# Patient Record
Sex: FEMALE | Race: AMERICAN INDIAN OR ALASKA NATIVE | NOT HISPANIC OR LATINO | Employment: OTHER | URBAN - METROPOLITAN AREA
[De-identification: names, ages, dates, MRNs, and addresses within clinical notes are randomized per-mention and may not be internally consistent; named-entity substitution may affect disease eponyms.]

---

## 2020-06-03 ENCOUNTER — HOSPITAL ENCOUNTER (EMERGENCY)
Facility: MEDICAL CENTER | Age: 85
End: 2020-06-03
Attending: EMERGENCY MEDICINE
Payer: MEDICARE

## 2020-06-03 ENCOUNTER — APPOINTMENT (OUTPATIENT)
Dept: RADIOLOGY | Facility: MEDICAL CENTER | Age: 85
End: 2020-06-03
Attending: EMERGENCY MEDICINE
Payer: MEDICARE

## 2020-06-03 VITALS
RESPIRATION RATE: 18 BRPM | HEART RATE: 74 BPM | SYSTOLIC BLOOD PRESSURE: 129 MMHG | TEMPERATURE: 98.6 F | WEIGHT: 146 LBS | DIASTOLIC BLOOD PRESSURE: 59 MMHG | OXYGEN SATURATION: 96 %

## 2020-06-03 DIAGNOSIS — R51.9 ACUTE NONINTRACTABLE HEADACHE, UNSPECIFIED HEADACHE TYPE: ICD-10-CM

## 2020-06-03 DIAGNOSIS — N30.00 ACUTE CYSTITIS WITHOUT HEMATURIA: ICD-10-CM

## 2020-06-03 LAB
ALBUMIN SERPL BCP-MCNC: 3.7 G/DL (ref 3.2–4.9)
ALBUMIN/GLOB SERPL: 1.1 G/DL
ALP SERPL-CCNC: 210 U/L (ref 30–99)
ALT SERPL-CCNC: 40 U/L (ref 2–50)
ANION GAP SERPL CALC-SCNC: 13 MMOL/L (ref 7–16)
APPEARANCE UR: ABNORMAL
AST SERPL-CCNC: 47 U/L (ref 12–45)
BACTERIA #/AREA URNS HPF: ABNORMAL /HPF
BASOPHILS # BLD AUTO: 0.2 % (ref 0–1.8)
BASOPHILS # BLD: 0.01 K/UL (ref 0–0.12)
BILIRUB SERPL-MCNC: 0.5 MG/DL (ref 0.1–1.5)
BILIRUB UR QL STRIP.AUTO: ABNORMAL
BUN SERPL-MCNC: 19 MG/DL (ref 8–22)
CALCIUM SERPL-MCNC: 8.9 MG/DL (ref 8.5–10.5)
CHLORIDE SERPL-SCNC: 95 MMOL/L (ref 96–112)
CO2 SERPL-SCNC: 23 MMOL/L (ref 20–33)
COLOR UR: ABNORMAL
CREAT SERPL-MCNC: 0.75 MG/DL (ref 0.5–1.4)
EKG IMPRESSION: NORMAL
EOSINOPHIL # BLD AUTO: 0.01 K/UL (ref 0–0.51)
EOSINOPHIL NFR BLD: 0.2 % (ref 0–6.9)
EPI CELLS #/AREA URNS HPF: ABNORMAL /HPF
ERYTHROCYTE [DISTWIDTH] IN BLOOD BY AUTOMATED COUNT: 40.1 FL (ref 35.9–50)
GLOBULIN SER CALC-MCNC: 3.5 G/DL (ref 1.9–3.5)
GLUCOSE SERPL-MCNC: 133 MG/DL (ref 65–99)
GLUCOSE UR STRIP.AUTO-MCNC: NEGATIVE MG/DL
HCT VFR BLD AUTO: 40.1 % (ref 37–47)
HGB BLD-MCNC: 13.2 G/DL (ref 12–16)
HYALINE CASTS #/AREA URNS LPF: ABNORMAL /LPF
IMM GRANULOCYTES # BLD AUTO: 0.01 K/UL (ref 0–0.11)
IMM GRANULOCYTES NFR BLD AUTO: 0.2 % (ref 0–0.9)
KETONES UR STRIP.AUTO-MCNC: 15 MG/DL
LEUKOCYTE ESTERASE UR QL STRIP.AUTO: ABNORMAL
LYMPHOCYTES # BLD AUTO: 0.95 K/UL (ref 1–4.8)
LYMPHOCYTES NFR BLD: 17.4 % (ref 22–41)
MCH RBC QN AUTO: 28 PG (ref 27–33)
MCHC RBC AUTO-ENTMCNC: 32.9 G/DL (ref 33.6–35)
MCV RBC AUTO: 85 FL (ref 81.4–97.8)
MICRO URNS: ABNORMAL
MONOCYTES # BLD AUTO: 0.39 K/UL (ref 0–0.85)
MONOCYTES NFR BLD AUTO: 7.1 % (ref 0–13.4)
NEUTROPHILS # BLD AUTO: 4.09 K/UL (ref 2–7.15)
NEUTROPHILS NFR BLD: 74.9 % (ref 44–72)
NITRITE UR QL STRIP.AUTO: NEGATIVE
NRBC # BLD AUTO: 0 K/UL
NRBC BLD-RTO: 0 /100 WBC
PH UR STRIP.AUTO: 7 [PH] (ref 5–8)
PLATELET # BLD AUTO: 261 K/UL (ref 164–446)
PMV BLD AUTO: 9.9 FL (ref 9–12.9)
POTASSIUM SERPL-SCNC: 4.3 MMOL/L (ref 3.6–5.5)
PROT SERPL-MCNC: 7.2 G/DL (ref 6–8.2)
PROT UR QL STRIP: 100 MG/DL
RBC # BLD AUTO: 4.72 M/UL (ref 4.2–5.4)
RBC # URNS HPF: ABNORMAL /HPF
RBC UR QL AUTO: ABNORMAL
SODIUM SERPL-SCNC: 131 MMOL/L (ref 135–145)
SP GR UR STRIP.AUTO: 1.02
UROBILINOGEN UR STRIP.AUTO-MCNC: 1 MG/DL
WBC # BLD AUTO: 5.5 K/UL (ref 4.8–10.8)
WBC #/AREA URNS HPF: ABNORMAL /HPF

## 2020-06-03 PROCEDURE — 87186 SC STD MICRODIL/AGAR DIL: CPT

## 2020-06-03 PROCEDURE — A9270 NON-COVERED ITEM OR SERVICE: HCPCS | Performed by: EMERGENCY MEDICINE

## 2020-06-03 PROCEDURE — 700102 HCHG RX REV CODE 250 W/ 637 OVERRIDE(OP): Performed by: EMERGENCY MEDICINE

## 2020-06-03 PROCEDURE — 81001 URINALYSIS AUTO W/SCOPE: CPT

## 2020-06-03 PROCEDURE — 93005 ELECTROCARDIOGRAM TRACING: CPT | Performed by: EMERGENCY MEDICINE

## 2020-06-03 PROCEDURE — 87086 URINE CULTURE/COLONY COUNT: CPT

## 2020-06-03 PROCEDURE — 70450 CT HEAD/BRAIN W/O DYE: CPT

## 2020-06-03 PROCEDURE — 80053 COMPREHEN METABOLIC PANEL: CPT

## 2020-06-03 PROCEDURE — 93005 ELECTROCARDIOGRAM TRACING: CPT

## 2020-06-03 PROCEDURE — 87077 CULTURE AEROBIC IDENTIFY: CPT

## 2020-06-03 PROCEDURE — 99284 EMERGENCY DEPT VISIT MOD MDM: CPT

## 2020-06-03 PROCEDURE — 700105 HCHG RX REV CODE 258: Performed by: EMERGENCY MEDICINE

## 2020-06-03 PROCEDURE — 85025 COMPLETE CBC W/AUTO DIFF WBC: CPT

## 2020-06-03 RX ORDER — SULFAMETHOXAZOLE AND TRIMETHOPRIM 800; 160 MG/1; MG/1
1 TABLET ORAL ONCE
Status: COMPLETED | OUTPATIENT
Start: 2020-06-03 | End: 2020-06-03

## 2020-06-03 RX ORDER — SODIUM CHLORIDE 9 MG/ML
1000 INJECTION, SOLUTION INTRAVENOUS ONCE
Status: COMPLETED | OUTPATIENT
Start: 2020-06-03 | End: 2020-06-03

## 2020-06-03 RX ORDER — MECLIZINE HCL 12.5 MG/1
12.5 TABLET ORAL 3 TIMES DAILY PRN
COMMUNITY

## 2020-06-03 RX ORDER — SULFAMETHOXAZOLE AND TRIMETHOPRIM 800; 160 MG/1; MG/1
1 TABLET ORAL 2 TIMES DAILY
Qty: 10 TAB | Refills: 0 | Status: SHIPPED | OUTPATIENT
Start: 2020-06-03 | End: 2020-06-08

## 2020-06-03 RX ORDER — BUTALBITAL, ACETAMINOPHEN AND CAFFEINE 50; 325; 40 MG/1; MG/1; MG/1
1 TABLET ORAL ONCE
Status: COMPLETED | OUTPATIENT
Start: 2020-06-03 | End: 2020-06-03

## 2020-06-03 RX ORDER — SULFAMETHOXAZOLE AND TRIMETHOPRIM 800; 160 MG/1; MG/1
1 TABLET ORAL 2 TIMES DAILY
Qty: 10 TAB | Refills: 0 | Status: SHIPPED | OUTPATIENT
Start: 2020-06-03 | End: 2020-06-03 | Stop reason: SDUPTHER

## 2020-06-03 RX ADMIN — BUTALBITAL, ACETAMINOPHEN, AND CAFFEINE 1 TABLET: 50; 325; 40 TABLET ORAL at 16:14

## 2020-06-03 RX ADMIN — SODIUM CHLORIDE 1000 ML: 9 INJECTION, SOLUTION INTRAVENOUS at 16:14

## 2020-06-03 RX ADMIN — SULFAMETHOXAZOLE AND TRIMETHOPRIM 1 TABLET: 800; 160 TABLET ORAL at 18:53

## 2020-06-03 SDOH — HEALTH STABILITY: MENTAL HEALTH: HOW OFTEN DO YOU HAVE A DRINK CONTAINING ALCOHOL?: NEVER

## 2020-06-03 NOTE — LETTER
6/7/2020               Ghada [facility] [address]        Dear Ghada (MR#[id number])    This letter is sent in regards to your, recent visit to the [facility] Emergency Department on 6/3/2020.  During the visit, tests were performed to assist the physician in a medical diagnosis.  A review of those tests requires that we notify you of the following:    Your urine culture was POSITIVE for a bacteria called Escherichia coli. The antibiotic prescribed for you (sulfamethoxazole-trimethoprim) should be active to treat this bacteria. IT IS IMPORTANT THAT YOU CONTINUE TAKING YOUR ANTIBIOTIC UNTIL IT IS FINISHED.      Please feel free to contact me at the number below if you have any questions or concerns. Thank you for your cooperation in the matter.    Sincerely,  ED Culture Follow-Up Staff  Jazmine Kumari, RonaldD    [facility], Emergency Department  [address] [place] [address] [id number] [phone]

## 2020-06-03 NOTE — ED PROVIDER NOTES
ED Provider Note    CHIEF COMPLAINT  Chief Complaint   Patient presents with   • Headache     x2 days, intermittent.    • Rapid Heart Beat     per tech in triage, pulse rate 170/min. EKG performed with rate in 90's       HPI  Ghada Lainez is a 85 y.o. female who presents for evaluation of a headache that has been present for the past 3 days, she describes his headache as a migraine but states that she is never had a headache in her life, no history of migraines.  No trauma, the onset was gradual over the course of several hours and even a day or so, it resolves when she goes to sleep and is present when she wakes up again.  No weakness numbness or tingling.  No recent injuries.  No fever, no vomiting.  Patient denies being on any blood thinners.  No neck or back pain, no chest pain, no palpitations, no shortness of breath, no abdominal pain and at this time she offers no other specific complaints.  She is a history of diabetes and states that she is been compliant with her medication, the daughter at the bedside reports that she does not drink enough fluids because she does not want to have to urinate often.    REVIEW OF SYSTEMS  Negative for fever, rash, chest pain, dyspnea, abdominal pain, nausea, vomiting, diarrhea, focal weakness, focal numbness, focal tingling, back pain. All other systems are negative.     PAST MEDICAL HISTORY  Past Medical History:   Diagnosis Date   • Hypertension        FAMILY HISTORY  History reviewed. No pertinent family history.    SOCIAL HISTORY  Social History     Tobacco Use   • Smoking status: Never Smoker   • Smokeless tobacco: Never Used   Substance Use Topics   • Alcohol use: Not Currently     Frequency: Never   • Drug use: Never       SURGICAL HISTORY  History reviewed. No pertinent surgical history.    CURRENT MEDICATIONS  I personally reviewed the medication list in the charting documentation.     ALLERGIES  No Known Allergies    MEDICAL RECORD  I have reviewed patient's medical  record and pertinent results are listed above.      PHYSICAL EXAM  VITAL SIGNS: /60   Pulse 75   Temp 36.3 °C (97.4 °F)   Resp 15   Wt 66.2 kg (146 lb)   SpO2 92%    Constitutional: Well appearing patient in no acute distress.  Not toxic, nor ill in appearance.  HENT: Mucus membranes dry.  Eyes: No scleral icterus. Normal conjunctiva   Neck: Supple, comfortable, nonpainful range of motion.   Cardiovascular: Regular heart rate and rhythm.   Thorax & Lungs: Chest is nontender.  Lungs are clear to auscultation with good air movement bilaterally.  No wheeze, rhonchi, nor rales.   Abdomen: Soft, with no tenderness, rebound nor guarding.  No mass or pulsatile mass appreciated.  Skin: Warm, dry. No rash appreciated  Extremities/Musculoskeletal: No sign of trauma. No asymmetric calf tenderness, erythema or edema. Normal range of motion   Neurologic: AAOx4, Cranial nerves II-XII grossly intact, PERRLA, EOMI, speech is normal, normal and symmetric motor and sensory functions upper and lower extremities bilaterally  Psychiatric: Normal affect appropriate for the clinical situation.    DIAGNOSTIC STUDIES / PROCEDURES    LABS/EKGs  Results for orders placed or performed during the hospital encounter of 06/03/20   CBC WITH DIFFERENTIAL   Result Value Ref Range    WBC 5.5 4.8 - 10.8 K/uL    RBC 4.72 4.20 - 5.40 M/uL    Hemoglobin 13.2 12.0 - 16.0 g/dL    Hematocrit 40.1 37.0 - 47.0 %    MCV 85.0 81.4 - 97.8 fL    MCH 28.0 27.0 - 33.0 pg    MCHC 32.9 (L) 33.6 - 35.0 g/dL    RDW 40.1 35.9 - 50.0 fL    Platelet Count 261 164 - 446 K/uL    MPV 9.9 9.0 - 12.9 fL    Neutrophils-Polys 74.90 (H) 44.00 - 72.00 %    Lymphocytes 17.40 (L) 22.00 - 41.00 %    Monocytes 7.10 0.00 - 13.40 %    Eosinophils 0.20 0.00 - 6.90 %    Basophils 0.20 0.00 - 1.80 %    Immature Granulocytes 0.20 0.00 - 0.90 %    Nucleated RBC 0.00 /100 WBC    Neutrophils (Absolute) 4.09 2.00 - 7.15 K/uL    Lymphs (Absolute) 0.95 (L) 1.00 - 4.80 K/uL    Monos  (Absolute) 0.39 0.00 - 0.85 K/uL    Eos (Absolute) 0.01 0.00 - 0.51 K/uL    Baso (Absolute) 0.01 0.00 - 0.12 K/uL    Immature Granulocytes (abs) 0.01 0.00 - 0.11 K/uL    NRBC (Absolute) 0.00 K/uL   CMP   Result Value Ref Range    Sodium 131 (L) 135 - 145 mmol/L    Potassium 4.3 3.6 - 5.5 mmol/L    Chloride 95 (L) 96 - 112 mmol/L    Co2 23 20 - 33 mmol/L    Anion Gap 13.0 7.0 - 16.0    Glucose 133 (H) 65 - 99 mg/dL    Bun 19 8 - 22 mg/dL    Creatinine 0.75 0.50 - 1.40 mg/dL    Calcium 8.9 8.5 - 10.5 mg/dL    AST(SGOT) 47 (H) 12 - 45 U/L    ALT(SGPT) 40 2 - 50 U/L    Alkaline Phosphatase 210 (H) 30 - 99 U/L    Total Bilirubin 0.5 0.1 - 1.5 mg/dL    Albumin 3.7 3.2 - 4.9 g/dL    Total Protein 7.2 6.0 - 8.2 g/dL    Globulin 3.5 1.9 - 3.5 g/dL    A-G Ratio 1.1 g/dL   URINALYSIS,CULTURE IF INDICATED    Specimen: Urine   Result Value Ref Range    Color DK Yellow     Character Turbid (A)     Specific Gravity 1.021 <1.035    Ph 7.0 5.0 - 8.0    Glucose Negative Negative mg/dL    Ketones 15 (A) Negative mg/dL    Protein 100 (A) Negative mg/dL    Bilirubin Small (A) Negative    Urobilinogen, Urine 1.0 Negative    Nitrite Negative Negative    Leukocyte Esterase Large (A) Negative    Occult Blood Small (A) Negative    Micro Urine Req Microscopic    URINE MICROSCOPIC (W/UA)   Result Value Ref Range    WBC Packed (A) /hpf    RBC 2-5 (A) /hpf    Bacteria Many (A) None /hpf    Epithelial Cells Moderate (A) /hpf    Hyaline Cast 0-2 /lpf   ESTIMATED GFR   Result Value Ref Range    GFR If African American >60 >60 mL/min/1.73 m 2    GFR If Non African American >60 >60 mL/min/1.73 m 2   EKG   Result Value Ref Range    Report       [facility] Emergency Dept.    Test Date:  2020  Pt Name:    YAZMIN ONTIVEROS                  Department: ER  MRN:        [id number]                      Room:       [facility]  Gender:     Female                       Technician: YEN  :        [id number]                   Requested By:ER TRIAGE  PROTOCOL  Order #:    [id number]                    Reading MD: LYNETTE MOON MD    Measurements  Intervals                                Axis  Rate:       97                           P:          1  IL:         144                          QRS:        13  QRSD:       82                           T:          15  QT:         325  QTc:        413    Interpretive Statements  12 Lead EKG interpreted by me to show: -- Rate 97 -- Rhythm: Normal sinus  rhythm  -- Axis: Normal -- IL and QRS Intervals: Normal -- T waves: No acute changes  --  ST segments: No acute changes -- Ectopy: Frequent PACs. My impression of this    EKG: Does not indicate acute ischemia at this time.  Haylie bonilla Signed On 6-3-2020 18:20:35 PDT by LYNETTE MOON MD          RADIOLOGY  CT-HEAD W/O   Final Result      1. No CT evidence of acute infarct, hemorrhage or mass.   2. Mild to moderate global parenchymal atrophy. Chronic small vessel ischemic changes.   3. Chronic sinus disease.            COURSE & MEDICAL DECISION MAKING  I have reviewed any medical record information, laboratory studies and radiographic results as noted above.  Differential diagnoses includes: Intracranial hemorrhage, intracranial mass, dehydration, electrolyte abnormalities, anemia, DKA, UTI    Encounter Summary: This is a 85 y.o. female with a headache, been present for 3 days been intermittently, no focal neurologic complaints or findings on exam, this is atypical for her however as she reports she does not ever get headaches and given her advanced age I be concerned about intracranial hemorrhage and other sorts of intracranial abnormalities that would necessitate a head CT so this will be performed here in the emergency department.  Additionally when she checked into triage she was noted to be tachycardic in the 160s, she was never symptomatic and I spoke with the emergency technician who reports that that was a reading off of an oximeter, when he hooked her up  to the EKG machine there was no evidence of tachycardia, at this point since she was asymptomatic and is not tachycardic I suspect that was likely when he is reading, will check blood work however as she is diabetic, urinalysis, administer some IV fluids as she does appear to be dehydrated and then reevaluate ------ work-up is significant for urinary tract infection.  The head CT is without evidence of acute abnormalities, blood work is otherwise unremarkable, the patient is reevaluated and her headache has improved and she is at this point feeling better.  She will be treated with Bactrim, first dose here in the emergency department, prescription for the rest, strict return instructions discussed with the patient and her daughter at the bedside.    HYDRATION: Based on the patient's presentation of Dehydration the patient was given IV fluids. IV Hydration was used because oral hydration was not adequate alone. Upon recheck following hydration, the patient was Improving.        DISPOSITION: Discharge home in stable condition      FINAL IMPRESSION  1. Acute cystitis without hematuria    2. Acute nonintractable headache, unspecified headache type           This dictation was created using voice recognition software. The accuracy of the dictation is limited to the abilities of the software. I expect there may be some errors of grammar and possibly content. The nursing notes were reviewed and certain aspects of this information were incorporated into this note.    Electronically signed by: Prabhakar Elmore M.D., 6/3/2020 3:46 PM

## 2020-06-03 NOTE — ED TRIAGE NOTES
Chief Complaint   Patient presents with   • Headache     x2 days, intermittent.    • Rapid Heart Beat     per tech in triage, pulse rate 170/min. EKG performed with rate in 90's     To triage by wheelchair for above. Hard of hearing. Explained triage process, to waiting room. Asked to inform RN if questions or concerns arise.

## 2020-06-03 NOTE — ED NOTES
Pt taken to . Changed into gown. Ambulated to and from restroom with steady gait. Urine collected. Urine dark in appearance. Admits to not drinking much water because she doesn't like going to the bathroom a lot.

## 2020-06-04 NOTE — ED NOTES
The patient has been provided with discharge education and information.  The patient was also provided with instructions on follow up care and return precautions.  The patient verbalizes understanding of discharge instructions, follow up care, and return precautions.  All questions have been answered.  One RX written by ANGIE.  NAD, A/Rosalba, good color and appropriate at time of discharge.  Patient ambulated steady gait out of department with family.

## 2020-06-05 LAB
BACTERIA UR CULT: ABNORMAL
SIGNIFICANT IND 70042: ABNORMAL
SITE SITE: ABNORMAL
SOURCE SOURCE: ABNORMAL

## 2020-06-07 NOTE — ED NOTES
ED Positive Culture Follow-up/Notification Note:    Date: 6/7/2020     Patient seen in the ED on 6/3/2020 for headache, tachycardia.   1. Acute cystitis without hematuria    2. Acute nonintractable headache, unspecified headache type       Discharge Medication List as of 6/3/2020  6:59 PM      START taking these medications    Details   sulfamethoxazole-trimethoprim (BACTRIM DS) 800-160 MG tablet Take 1 Tab by mouth 2 times a day for 5 days., Disp-10 Tab,R-0, Normal             Allergies: Patient has no known allergies.     Vitals:    06/03/20 1700 06/03/20 1730 06/03/20 1800 06/03/20 1854   BP: 137/65 130/60 123/60 129/59   Pulse: 71 68 70 74   Resp: (!) 21 14 15 18   Temp:    37 °C (98.6 °F)   TempSrc:    Temporal   SpO2: 95% 97% 95% 96%   Weight:           Final cultures:   Results     Procedure Component Value Units Date/Time    URINE CULTURE(NEW) [[id number]]  (Abnormal)  (Susceptibility) Collected:  06/03/20 1545    Order Status:  Completed Specimen:  Urine Updated:  06/05/20 1131     Significant Indicator POS     Source UR     Site -     Culture Result -      Escherichia coli  >100,000 cfu/mL        Viridans Streptococcus  >100,000 cfu/mL      Susceptibility     Escherichia coli (1)     Antibiotic Interpretation Microscan Method Status    Ampicillin Sensitive <=8 mcg/mL AMY Final    Ceftriaxone Sensitive <=1 mcg/mL AMY Final    Ceftazidime Sensitive <=1 mcg/mL AMY Final    Cefotaxime Sensitive <=2 mcg/mL AMY Final    Cefazolin Sensitive <=2 mcg/mL AMY Final    Ciprofloxacin Sensitive <=1 mcg/mL AMY Final    Ampicillin/sulbactam Sensitive <=8/4 mcg/mL AMY Final    Cefepime Sensitive <=2 mcg/mL AMY Final    Tobramycin Sensitive <=4 mcg/mL AMY Final    Cefotetan Sensitive <=16 mcg/mL AMY Final    Nitrofurantoin Sensitive <=32 mcg/mL AMY Final    Gentamicin Sensitive <=4 mcg/mL AMY Final    Levofloxacin Sensitive <=2 mcg/mL AMY Final    Pip/Tazobactam Sensitive <=16 mcg/mL AMY Final    Trimeth/Sulfa Sensitive  <=2/38 mcg/mL AMY Final                   URINALYSIS,CULTURE IF INDICATED [[id number]]  (Abnormal) Collected:  06/03/20 1545    Order Status:  Completed Specimen:  Urine Updated:  06/03/20 1631     Color DK Yellow     Character Turbid     Specific Gravity 1.021     Ph 7.0     Glucose Negative mg/dL      Ketones 15 mg/dL      Protein 100 mg/dL      Bilirubin Small     Urobilinogen, Urine 1.0     Nitrite Negative     Leukocyte Esterase Large     Occult Blood Small     Micro Urine Req Microscopic          Plan:   Appropriate antibiotic therapy prescribed. No changes required based upon culture result. Viridans streptococcus is most likely a colonizer.  Sent letter to patient to notify of positive culture result and encourage compliance with prescribed antibiotics.     Jazmine Kumari, RonaldD

## 2020-06-10 ENCOUNTER — APPOINTMENT (OUTPATIENT)
Dept: RADIOLOGY | Facility: MEDICAL CENTER | Age: 85
End: 2020-06-10
Attending: EMERGENCY MEDICINE
Payer: MEDICARE

## 2020-06-10 ENCOUNTER — HOSPITAL ENCOUNTER (OUTPATIENT)
Facility: MEDICAL CENTER | Age: 85
End: 2020-06-13
Attending: EMERGENCY MEDICINE | Admitting: INTERNAL MEDICINE
Payer: MEDICARE

## 2020-06-10 DIAGNOSIS — E87.1 HYPONATREMIA: ICD-10-CM

## 2020-06-10 DIAGNOSIS — R93.89 ABNORMAL CHEST X-RAY: ICD-10-CM

## 2020-06-10 DIAGNOSIS — U07.1 COVID-19 VIRUS INFECTION: ICD-10-CM

## 2020-06-10 LAB
ALBUMIN SERPL BCP-MCNC: 3.1 G/DL (ref 3.2–4.9)
ALBUMIN/GLOB SERPL: 0.9 G/DL
ALP SERPL-CCNC: 204 U/L (ref 30–99)
ALT SERPL-CCNC: 23 U/L (ref 2–50)
ANION GAP SERPL CALC-SCNC: 8 MMOL/L (ref 7–16)
AST SERPL-CCNC: 19 U/L (ref 12–45)
BASOPHILS # BLD AUTO: 0.3 % (ref 0–1.8)
BASOPHILS # BLD: 0.02 K/UL (ref 0–0.12)
BILIRUB SERPL-MCNC: 0.8 MG/DL (ref 0.1–1.5)
BUN SERPL-MCNC: 14 MG/DL (ref 8–22)
CALCIUM SERPL-MCNC: 8.6 MG/DL (ref 8.5–10.5)
CHLORIDE SERPL-SCNC: 99 MMOL/L (ref 96–112)
CK SERPL-CCNC: 19 U/L (ref 0–154)
CO2 SERPL-SCNC: 21 MMOL/L (ref 20–33)
COVID ORDER STATUS COVID19: NORMAL
CREAT SERPL-MCNC: 0.77 MG/DL (ref 0.5–1.4)
CRP SERPL HS-MCNC: 7.45 MG/DL (ref 0–0.75)
EOSINOPHIL # BLD AUTO: 0.09 K/UL (ref 0–0.51)
EOSINOPHIL NFR BLD: 1.2 % (ref 0–6.9)
ERYTHROCYTE [DISTWIDTH] IN BLOOD BY AUTOMATED COUNT: 39.9 FL (ref 35.9–50)
GLOBULIN SER CALC-MCNC: 3.4 G/DL (ref 1.9–3.5)
GLUCOSE SERPL-MCNC: 117 MG/DL (ref 65–99)
HCT VFR BLD AUTO: 36.2 % (ref 37–47)
HGB BLD-MCNC: 11.9 G/DL (ref 12–16)
IMM GRANULOCYTES # BLD AUTO: 0.04 K/UL (ref 0–0.11)
IMM GRANULOCYTES NFR BLD AUTO: 0.6 % (ref 0–0.9)
LACTATE BLD-SCNC: 1 MMOL/L (ref 0.5–2)
LDH SERPL L TO P-CCNC: 200 U/L (ref 107–266)
LYMPHOCYTES # BLD AUTO: 1.27 K/UL (ref 1–4.8)
LYMPHOCYTES NFR BLD: 17.6 % (ref 22–41)
MAGNESIUM SERPL-MCNC: 1.7 MG/DL (ref 1.5–2.5)
MCH RBC QN AUTO: 28.2 PG (ref 27–33)
MCHC RBC AUTO-ENTMCNC: 32.9 G/DL (ref 33.6–35)
MCV RBC AUTO: 85.8 FL (ref 81.4–97.8)
MONOCYTES # BLD AUTO: 0.6 K/UL (ref 0–0.85)
MONOCYTES NFR BLD AUTO: 8.3 % (ref 0–13.4)
NEUTROPHILS # BLD AUTO: 5.19 K/UL (ref 2–7.15)
NEUTROPHILS NFR BLD: 72 % (ref 44–72)
NRBC # BLD AUTO: 0 K/UL
NRBC BLD-RTO: 0 /100 WBC
NT-PROBNP SERPL IA-MCNC: 278 PG/ML (ref 0–125)
PHOSPHATE SERPL-MCNC: 3 MG/DL (ref 2.5–4.5)
PLATELET # BLD AUTO: 376 K/UL (ref 164–446)
PMV BLD AUTO: 9.6 FL (ref 9–12.9)
POTASSIUM SERPL-SCNC: 4.7 MMOL/L (ref 3.6–5.5)
PROT SERPL-MCNC: 6.5 G/DL (ref 6–8.2)
RBC # BLD AUTO: 4.22 M/UL (ref 4.2–5.4)
SODIUM SERPL-SCNC: 128 MMOL/L (ref 135–145)
TROPONIN T SERPL-MCNC: 11 NG/L (ref 6–19)
WBC # BLD AUTO: 7.2 K/UL (ref 4.8–10.8)

## 2020-06-10 PROCEDURE — 86140 C-REACTIVE PROTEIN: CPT

## 2020-06-10 PROCEDURE — 87040 BLOOD CULTURE FOR BACTERIA: CPT | Mod: 91

## 2020-06-10 PROCEDURE — 83880 ASSAY OF NATRIURETIC PEPTIDE: CPT

## 2020-06-10 PROCEDURE — 82728 ASSAY OF FERRITIN: CPT

## 2020-06-10 PROCEDURE — 71045 X-RAY EXAM CHEST 1 VIEW: CPT

## 2020-06-10 PROCEDURE — 85730 THROMBOPLASTIN TIME PARTIAL: CPT

## 2020-06-10 PROCEDURE — 85652 RBC SED RATE AUTOMATED: CPT

## 2020-06-10 PROCEDURE — 82550 ASSAY OF CK (CPK): CPT

## 2020-06-10 PROCEDURE — 85379 FIBRIN DEGRADATION QUANT: CPT

## 2020-06-10 PROCEDURE — 93005 ELECTROCARDIOGRAM TRACING: CPT | Performed by: EMERGENCY MEDICINE

## 2020-06-10 PROCEDURE — 85610 PROTHROMBIN TIME: CPT

## 2020-06-10 PROCEDURE — 80053 COMPREHEN METABOLIC PANEL: CPT

## 2020-06-10 PROCEDURE — 96368 THER/DIAG CONCURRENT INF: CPT

## 2020-06-10 PROCEDURE — 84484 ASSAY OF TROPONIN QUANT: CPT

## 2020-06-10 PROCEDURE — C9803 HOPD COVID-19 SPEC COLLECT: HCPCS | Performed by: EMERGENCY MEDICINE

## 2020-06-10 PROCEDURE — 83605 ASSAY OF LACTIC ACID: CPT

## 2020-06-10 PROCEDURE — 99285 EMERGENCY DEPT VISIT HI MDM: CPT

## 2020-06-10 PROCEDURE — 84100 ASSAY OF PHOSPHORUS: CPT

## 2020-06-10 PROCEDURE — 85025 COMPLETE CBC W/AUTO DIFF WBC: CPT

## 2020-06-10 PROCEDURE — U0004 COV-19 TEST NON-CDC HGH THRU: HCPCS

## 2020-06-10 PROCEDURE — 83520 IMMUNOASSAY QUANT NOS NONAB: CPT

## 2020-06-10 PROCEDURE — 83735 ASSAY OF MAGNESIUM: CPT

## 2020-06-10 PROCEDURE — 83615 LACTATE (LD) (LDH) ENZYME: CPT

## 2020-06-10 PROCEDURE — 84145 PROCALCITONIN (PCT): CPT

## 2020-06-10 PROCEDURE — 96365 THER/PROPH/DIAG IV INF INIT: CPT

## 2020-06-10 ASSESSMENT — FIBROSIS 4 INDEX: FIB4 SCORE: 2.39

## 2020-06-10 ASSESSMENT — LIFESTYLE VARIABLES: DO YOU DRINK ALCOHOL: NO

## 2020-06-11 ENCOUNTER — APPOINTMENT (OUTPATIENT)
Dept: RADIOLOGY | Facility: MEDICAL CENTER | Age: 85
End: 2020-06-11
Attending: EMERGENCY MEDICINE
Payer: MEDICARE

## 2020-06-11 PROBLEM — J12.82 PNEUMONIA DUE TO COVID-19 VIRUS: Status: ACTIVE | Noted: 2020-06-11

## 2020-06-11 PROBLEM — D64.9 ANEMIA: Status: ACTIVE | Noted: 2020-06-11

## 2020-06-11 PROBLEM — U07.1 PNEUMONIA DUE TO COVID-19 VIRUS: Status: ACTIVE | Noted: 2020-06-11

## 2020-06-11 PROBLEM — E87.1 HYPONATREMIA: Status: ACTIVE | Noted: 2020-06-11

## 2020-06-11 LAB
APTT PPP: 36.5 SEC (ref 24.7–36)
D DIMER PPP IA.FEU-MCNC: 1.09 UG/ML (FEU) (ref 0–0.5)
EKG IMPRESSION: NORMAL
ERYTHROCYTE [SEDIMENTATION RATE] IN BLOOD BY WESTERGREN METHOD: 82 MM/HOUR (ref 0–30)
FERRITIN SERPL-MCNC: 509 NG/ML (ref 10–291)
INR PPP: 1 (ref 0.87–1.13)
PROCALCITONIN SERPL-MCNC: <0.05 NG/ML
PROTHROMBIN TIME: 13.4 SEC (ref 12–14.6)
SARS-COV-2 RNA RESP QL NAA+PROBE: DETECTED
SPECIMEN SOURCE: ABNORMAL

## 2020-06-11 PROCEDURE — G0378 HOSPITAL OBSERVATION PER HR: HCPCS

## 2020-06-11 PROCEDURE — 71275 CT ANGIOGRAPHY CHEST: CPT

## 2020-06-11 PROCEDURE — 99220 PR INITIAL OBSERVATION CARE,LEVL III: CPT | Mod: CS | Performed by: INTERNAL MEDICINE

## 2020-06-11 PROCEDURE — 700117 HCHG RX CONTRAST REV CODE 255: Performed by: EMERGENCY MEDICINE

## 2020-06-11 PROCEDURE — 700105 HCHG RX REV CODE 258: Performed by: EMERGENCY MEDICINE

## 2020-06-11 PROCEDURE — 700105 HCHG RX REV CODE 258: Performed by: INTERNAL MEDICINE

## 2020-06-11 PROCEDURE — A9270 NON-COVERED ITEM OR SERVICE: HCPCS | Performed by: INTERNAL MEDICINE

## 2020-06-11 PROCEDURE — 96368 THER/DIAG CONCURRENT INF: CPT

## 2020-06-11 PROCEDURE — 700102 HCHG RX REV CODE 250 W/ 637 OVERRIDE(OP): Performed by: INTERNAL MEDICINE

## 2020-06-11 PROCEDURE — 96365 THER/PROPH/DIAG IV INF INIT: CPT | Mod: XU

## 2020-06-11 PROCEDURE — 700111 HCHG RX REV CODE 636 W/ 250 OVERRIDE (IP): Performed by: EMERGENCY MEDICINE

## 2020-06-11 RX ORDER — ACETAMINOPHEN 325 MG/1
650 TABLET ORAL EVERY 6 HOURS PRN
Status: DISCONTINUED | OUTPATIENT
Start: 2020-06-11 | End: 2020-06-13 | Stop reason: HOSPADM

## 2020-06-11 RX ORDER — BISACODYL 10 MG
10 SUPPOSITORY, RECTAL RECTAL
Status: DISCONTINUED | OUTPATIENT
Start: 2020-06-11 | End: 2020-06-13 | Stop reason: HOSPADM

## 2020-06-11 RX ORDER — AZITHROMYCIN 500 MG/1
500 INJECTION, POWDER, LYOPHILIZED, FOR SOLUTION INTRAVENOUS ONCE
Status: COMPLETED | OUTPATIENT
Start: 2020-06-11 | End: 2020-06-11

## 2020-06-11 RX ORDER — SODIUM CHLORIDE 9 MG/ML
INJECTION, SOLUTION INTRAVENOUS CONTINUOUS
Status: DISCONTINUED | OUTPATIENT
Start: 2020-06-11 | End: 2020-06-11

## 2020-06-11 RX ORDER — ONDANSETRON 4 MG/1
4 TABLET, ORALLY DISINTEGRATING ORAL EVERY 4 HOURS PRN
Status: DISCONTINUED | OUTPATIENT
Start: 2020-06-11 | End: 2020-06-13 | Stop reason: HOSPADM

## 2020-06-11 RX ORDER — ONDANSETRON 2 MG/ML
4 INJECTION INTRAMUSCULAR; INTRAVENOUS EVERY 4 HOURS PRN
Status: DISCONTINUED | OUTPATIENT
Start: 2020-06-11 | End: 2020-06-13 | Stop reason: HOSPADM

## 2020-06-11 RX ORDER — AMOXICILLIN 250 MG
2 CAPSULE ORAL 2 TIMES DAILY
Status: DISCONTINUED | OUTPATIENT
Start: 2020-06-11 | End: 2020-06-13 | Stop reason: HOSPADM

## 2020-06-11 RX ORDER — POLYETHYLENE GLYCOL 3350 17 G/17G
1 POWDER, FOR SOLUTION ORAL
Status: DISCONTINUED | OUTPATIENT
Start: 2020-06-11 | End: 2020-06-13 | Stop reason: HOSPADM

## 2020-06-11 RX ADMIN — SODIUM CHLORIDE: 9 INJECTION, SOLUTION INTRAVENOUS at 04:07

## 2020-06-11 RX ADMIN — AZITHROMYCIN MONOHYDRATE 500 MG: 500 INJECTION, POWDER, LYOPHILIZED, FOR SOLUTION INTRAVENOUS at 00:42

## 2020-06-11 RX ADMIN — SODIUM CHLORIDE 3 G: 900 INJECTION INTRAVENOUS at 00:41

## 2020-06-11 RX ADMIN — IOHEXOL 65 ML: 350 INJECTION, SOLUTION INTRAVENOUS at 02:54

## 2020-06-11 RX ADMIN — DOCUSATE SODIUM 50 MG AND SENNOSIDES 8.6 MG 2 TABLET: 8.6; 5 TABLET, FILM COATED ORAL at 04:08

## 2020-06-11 ASSESSMENT — ENCOUNTER SYMPTOMS
FEVER: 0
WHEEZING: 0
BLOOD IN STOOL: 0
FOCAL WEAKNESS: 0
VOMITING: 0
DIARRHEA: 0
SEIZURES: 0
NAUSEA: 0
PALPITATIONS: 0
NECK PAIN: 0
COUGH: 1
FLANK PAIN: 0
CHILLS: 0
DIAPHORESIS: 0
SORE THROAT: 0
SPUTUM PRODUCTION: 0
HEADACHES: 0
BACK PAIN: 0
MYALGIAS: 0
BLURRED VISION: 0
SHORTNESS OF BREATH: 1
DIZZINESS: 0
BRUISES/BLEEDS EASILY: 0
ABDOMINAL PAIN: 0

## 2020-06-11 ASSESSMENT — COGNITIVE AND FUNCTIONAL STATUS - GENERAL
WALKING IN HOSPITAL ROOM: A LITTLE
MOVING TO AND FROM BED TO CHAIR: A LITTLE
TURNING FROM BACK TO SIDE WHILE IN FLAT BAD: A LITTLE
DRESSING REGULAR LOWER BODY CLOTHING: A LITTLE
STANDING UP FROM CHAIR USING ARMS: A LITTLE
CLIMB 3 TO 5 STEPS WITH RAILING: A LITTLE
MOVING FROM LYING ON BACK TO SITTING ON SIDE OF FLAT BED: A LITTLE
DAILY ACTIVITIY SCORE: 18
MOBILITY SCORE: 18
PERSONAL GROOMING: A LITTLE
SUGGESTED CMS G CODE MODIFIER DAILY ACTIVITY: CK
EATING MEALS: A LITTLE
DRESSING REGULAR UPPER BODY CLOTHING: A LITTLE
SUGGESTED CMS G CODE MODIFIER MOBILITY: CK
TOILETING: A LITTLE
HELP NEEDED FOR BATHING: A LITTLE

## 2020-06-11 ASSESSMENT — PATIENT HEALTH QUESTIONNAIRE - PHQ9
1. LITTLE INTEREST OR PLEASURE IN DOING THINGS: NOT AT ALL
2. FEELING DOWN, DEPRESSED, IRRITABLE, OR HOPELESS: NOT AT ALL
SUM OF ALL RESPONSES TO PHQ9 QUESTIONS 1 AND 2: 0

## 2020-06-11 NOTE — ED NOTES
Patient family member Elizabeth (patients daughter in law called for update) [phone]. Provided POC. Instructed to have one person to contact and update family.

## 2020-06-11 NOTE — ASSESSMENT & PLAN NOTE
Monitor oxygen saturations closely, consider proning and high flow oxygen if patient develops worsening hypoxia  The patient continues to be on room air, she appears to be fairly asymptomatic  She does present with abnormal ferritin level and d-dimer  Supportive care  If patient has worsening symptoms consider Remdesivir  Monitor closely  Contact/eye/droplet precautions in place

## 2020-06-11 NOTE — ED NOTES
Assist RN: Pt placed on bed pan and removed from bed pan. Urinated 150mL. No other needs at this time.

## 2020-06-11 NOTE — ASSESSMENT & PLAN NOTE
Patient denies any bleeding or melena  Monitor CBC, transfuse for hemoglobin less than 7  We will check iron studies in the morning

## 2020-06-11 NOTE — ED PROVIDER NOTES
"ED Provider Note    CHIEF COMPLAINT  Cough    HPI  Ghada Lainez is a 85 y.o. female who presents to the emergency department for evaluation of a cough.  History is obtained from nursing who received report from EMS as well as from the patient although history is somewhat limited secondary to the patient's hearing impairment.  Apparently, the patient has been coughing over the last week.  She denies a productive cough but states that she did have a fever earlier today.  She is unsure what the temperature was.  Per EMS, the patient lives at home with her son who is apparently positive for COVID-19.  The patient was also seen here 7 days ago and diagnosed with a urinary tract infection.  She was given antibiotics and discharged home.  Apparently, there is some concern for change in mental status as well, but she is currently A&O x3.    REVIEW OF SYSTEMS  See HPI for further details. All other systems are negative.     PAST MEDICAL HISTORY   has a past medical history of Hypertension.    SOCIAL HISTORY  Social History     Tobacco Use   • Smoking status: Never Smoker   • Smokeless tobacco: Never Used   Substance and Sexual Activity   • Alcohol use: Not Currently     Frequency: Never   • Drug use: Never   • Sexual activity: Not on file       SURGICAL HISTORY  patient denies any surgical history    CURRENT MEDICATIONS  Home Medications    **Home medications have not yet been reviewed for this encounter**         ALLERGIES  No Known Allergies    PHYSICAL EXAM  VITAL SIGNS: /62   Pulse 73   Temp 36.4 °C (97.5 °F) (Temporal)   Resp 17   Ht 1.6 m (5' 3\")   Wt 68 kg (150 lb)   SpO2 93%   BMI 26.57 kg/m²    Constitutional: Alert and in no apparent distress.  Patient is hard of hearing.  HENT: Normocephalic atraumatic. Bilateral external ears normal. Nose normal. Mucous membranes are moist.  Eyes: Pupils are equal and reactive. Conjunctiva normal. Non-icteric sclera.   Neck: Normal range of motion without tenderness. " Supple. No meningeal signs.  Cardiovascular: Regular rate and rhythm. No murmurs, gallops or rubs.  Thorax & Lungs: Breath sounds are clear to auscultation bilaterally. No wheezing, rhonchi or rales.  Abdomen: Soft, nontender and nondistended. No peritoneal signs noted.  Skin: Warm and dry. No rashes are noted.  Back: No bony tenderness, No CVA tenderness.   Extremities: 2+ peripheral pulses. Cap refill is less than 2 seconds. No edema, cyanosis, or clubbing.  Musculoskeletal: Good range of motion in all major joints. No tenderness to palpation or major deformities noted.   Neurologic: Alert and oriented ×3. The patient moves all 4 extremities and follows commands.  Psychiatric: Affect is normal. Judgment appears to be intact.    DIAGNOSTIC STUDIES / PROCEDURES    LABS  Results for orders placed or performed during the hospital encounter of 06/10/20   CBC with Differential   Result Value Ref Range    WBC 7.2 4.8 - 10.8 K/uL    RBC 4.22 4.20 - 5.40 M/uL    Hemoglobin 11.9 (L) 12.0 - 16.0 g/dL    Hematocrit 36.2 (L) 37.0 - 47.0 %    MCV 85.8 81.4 - 97.8 fL    MCH 28.2 27.0 - 33.0 pg    MCHC 32.9 (L) 33.6 - 35.0 g/dL    RDW 39.9 35.9 - 50.0 fL    Platelet Count 376 164 - 446 K/uL    MPV 9.6 9.0 - 12.9 fL    Neutrophils-Polys 72.00 44.00 - 72.00 %    Lymphocytes 17.60 (L) 22.00 - 41.00 %    Monocytes 8.30 0.00 - 13.40 %    Eosinophils 1.20 0.00 - 6.90 %    Basophils 0.30 0.00 - 1.80 %    Immature Granulocytes 0.60 0.00 - 0.90 %    Nucleated RBC 0.00 /100 WBC    Neutrophils (Absolute) 5.19 2.00 - 7.15 K/uL    Lymphs (Absolute) 1.27 1.00 - 4.80 K/uL    Monos (Absolute) 0.60 0.00 - 0.85 K/uL    Eos (Absolute) 0.09 0.00 - 0.51 K/uL    Baso (Absolute) 0.02 0.00 - 0.12 K/uL    Immature Granulocytes (abs) 0.04 0.00 - 0.11 K/uL    NRBC (Absolute) 0.00 K/uL   Comp Metabolic Panel   Result Value Ref Range    Sodium 128 (L) 135 - 145 mmol/L    Potassium 4.7 3.6 - 5.5 mmol/L    Chloride 99 96 - 112 mmol/L    Co2 21 20 - 33 mmol/L     Anion Gap 8.0 7.0 - 16.0    Glucose 117 (H) 65 - 99 mg/dL    Bun 14 8 - 22 mg/dL    Creatinine 0.77 0.50 - 1.40 mg/dL    Calcium 8.6 8.5 - 10.5 mg/dL    AST(SGOT) 19 12 - 45 U/L    ALT(SGPT) 23 2 - 50 U/L    Alkaline Phosphatase 204 (H) 30 - 99 U/L    Total Bilirubin 0.8 0.1 - 1.5 mg/dL    Albumin 3.1 (L) 3.2 - 4.9 g/dL    Total Protein 6.5 6.0 - 8.2 g/dL    Globulin 3.4 1.9 - 3.5 g/dL    A-G Ratio 0.9 g/dL   Magnesium   Result Value Ref Range    Magnesium 1.7 1.5 - 2.5 mg/dL   Phosphorus   Result Value Ref Range    Phosphorus 3.0 2.5 - 4.5 mg/dL   Ferritin   Result Value Ref Range    Ferritin 509.0 (H) 10.0 - 291.0 ng/mL   Troponin   Result Value Ref Range    Troponin T 11 6 - 19 ng/L   Creatinine Kinase   Result Value Ref Range    CPK Total 19 0 - 154 U/L   D-Dimer   Result Value Ref Range    D-Dimer Screen 1.09 (H) 0.00 - 0.50 ug/mL (FEU)   CRP Quantitative (Non-Cardiac)   Result Value Ref Range    Stat C-Reactive Protein 7.45 (H) 0.00 - 0.75 mg/dL   proBrain Natriuretic Peptide, NT   Result Value Ref Range    NT-proBNP 278 (H) 0 - 125 pg/mL   Sed Rate   Result Value Ref Range    Sed Rate Westergren 82 (H) 0 - 30 mm/hour   Procalcitonin   Result Value Ref Range    Procalcitonin <0.05 <0.25 ng/mL   LDH   Result Value Ref Range    LDH Total 200 107 - 266 U/L   Lactic Acid   Result Value Ref Range    Lactic Acid 1.0 0.5 - 2.0 mmol/L   aPTT   Result Value Ref Range    APTT 36.5 (H) 24.7 - 36.0 sec   Prothrombin Time   Result Value Ref Range    PT 13.4 12.0 - 14.6 sec    INR 1.00 0.87 - 1.13   COVID/SARS CoV-2    Specimen: Nasopharyngeal; Respirate   Result Value Ref Range    COVID Order Status Received    SARS-CoV-2, PCR (In-House)   Result Value Ref Range    SARS-CoV-2 Source NP Swab     SARS-CoV-2 by PCR DETECTED (AA) NotDetected   ESTIMATED GFR   Result Value Ref Range    GFR If African American >60 >60 mL/min/1.73 m 2    GFR If Non African American >60 >60 mL/min/1.73 m 2   EKG   Result Value Ref Range     Report       [facility] Emergency Dept.    Test Date:  2020-06-10  Pt Name:    YAZMIN ONTIVEROS                  Department: ER  MRN:        [id number]                      Room:       [facility]  Gender:     Female                       Technician: [id number]  :        [id number]                   Requested By:COURTNEY JOHNSON  Order #:    [id number]                    Reading MD: Courtney Johnson    Measurements  Intervals                                Axis  Rate:       62                           P:          60  NE:         180                          QRS:        15  QRSD:       80                           T:          65  QT:         404  QTc:        411    Interpretive Statements  SINUS RHYTHM  LOW VOLTAGE THROUGHOUT  BORDERLINE T ABNORMALITIES, ANT-LAT LEADS  No ST elevation or depression noted.  Axis is normal.  Intervals are within  normal limits.  Compared to ECG 2020 12:41:46  Low QRS voltage now present  T-wave abnormality now present  Possible ischemia no longer present  Electronically  Signed On 2020 0:09:18 PDT by Courtney Johnson       RADIOLOGY  CT-CTA CHEST PULMONARY ARTERY W/ RECONS   Final Result         1.  No pulmonary embolus appreciated.   2.  Multilobar infiltrates   3.  Irregular linear structure in the anterior left spleen, appearance suggests calcification   4.  Atherosclerosis and atherosclerotic coronary artery disease.      DX-CHEST-PORTABLE (1 VIEW)   Final Result         1.  Pulmonary edema and/or infiltrates.   2.  Atherosclerosis        COURSE & MEDICAL DECISION MAKING  Pertinent Labs & Imaging studies reviewed. (See chart for details)    This is an 85-year-old female presenting to the emergency department for evaluation of a cough and potentially mental status changes.  On my initial evaluation, her vital signs were normal and she did not demonstrate any evidence of increased work of breathing.  She was noted to be hard of hearing but was answering my questions appropriately and was  alert and oriented x3.  She had had a CT performed 7 days ago when she was here which did not reveal any acute infarct, hemorrhage, or mass.  She complained of a cough and dyspnea and given her positive COVID contact at home, I am concerned that she may have COVID-19.  The work-up was ordered.  White blood cell count and lactic acid were normal, but she was noted to have leukopenia.  Ferritin and CRP were elevated.  D-dimer was also elevated at 1.  COVID was ordered and positive.  This is consistent with her clinical presentation.  A CT of the chest was ordered and no pulmonary emboli were noted, but multilobar infiltrates were demonstrated and consistent with her diagnosis of COVID-19.  She was covered with antibiotics in the emergency department.  I am less concerned for sepsis at this time given her normal vital signs.  The plan was made for admission.    1:04 AM - I discussed the case with Dr Fuentes, hospitalist. He agreed with the plan and accepted the patient.  Patient remained stable while in the emergency department.    FINAL IMPRESSION  1. Hyponatremia    2. Abnormal chest x-ray    3. COVID-19 virus infection      -ADMIT-    Electronically signed by: Courtney Pierce D.O., 6/10/2020 11:07 PM

## 2020-06-11 NOTE — PROGRESS NOTES
2 RN skin check complete.   Devices in place n/a.  Skin assessed under devices: no devices in place.  Confirmed pressure ulcers found on: NO PU.  New potential pressure ulcers noted on N/A.    Pt has a self report week old scratch from her puppy on Left Forearm.

## 2020-06-11 NOTE — ED TRIAGE NOTES
"Chief Complaint   Patient presents with   • Weakness   • Mental Status Change     Pt BIB EMS for above complaint. Pt was seen 6 days ago for a bladder infection but per family has no been the same sense and has been having increase weakness and confusion. EMS gave 250mL NS and 4mg Zofran. Pt has had contact w/ positive Covid pt. Placed on isolation and mask applied. Pt is A&Ox2 to self and place. Pt has complaints of dry cough but denies any pain. VS placed.    /61   Pulse 65   Temp 36.4 °C (97.5 °F) (Temporal)   Resp 16   Ht 1.6 m (5' 3\")   Wt 68 kg (150 lb)   SpO2 96%   BMI 26.57 kg/m²     "

## 2020-06-11 NOTE — PROGRESS NOTES
Patient seen and examined today.    Patient tolerating treatment and therapies.  All Data, Medication data reviewed.  Case discussed with nursing as available.  Plan of Care reviewed with patient and notified of changes.  Full note to follow tomorrow  The patient overall stable  Laboratory follow-up in a.m.

## 2020-06-12 LAB
ALBUMIN SERPL BCP-MCNC: 2.6 G/DL (ref 3.2–4.9)
ALBUMIN/GLOB SERPL: 0.8 G/DL
ALP SERPL-CCNC: 208 U/L (ref 30–99)
ALT SERPL-CCNC: 19 U/L (ref 2–50)
ANION GAP SERPL CALC-SCNC: 10 MMOL/L (ref 7–16)
AST SERPL-CCNC: 16 U/L (ref 12–45)
BASOPHILS # BLD AUTO: 0.3 % (ref 0–1.8)
BASOPHILS # BLD: 0.02 K/UL (ref 0–0.12)
BILIRUB SERPL-MCNC: 0.6 MG/DL (ref 0.1–1.5)
BUN SERPL-MCNC: 9 MG/DL (ref 8–22)
CALCIUM SERPL-MCNC: 8.9 MG/DL (ref 8.5–10.5)
CHLORIDE SERPL-SCNC: 105 MMOL/L (ref 96–112)
CO2 SERPL-SCNC: 19 MMOL/L (ref 20–33)
CREAT SERPL-MCNC: 0.45 MG/DL (ref 0.5–1.4)
D DIMER PPP IA.FEU-MCNC: 1.55 UG/ML (FEU) (ref 0–0.5)
EOSINOPHIL # BLD AUTO: 0.24 K/UL (ref 0–0.51)
EOSINOPHIL NFR BLD: 3.9 % (ref 0–6.9)
ERYTHROCYTE [DISTWIDTH] IN BLOOD BY AUTOMATED COUNT: 39.3 FL (ref 35.9–50)
FERRITIN SERPL-MCNC: 522 NG/ML (ref 10–291)
GLOBULIN SER CALC-MCNC: 3.4 G/DL (ref 1.9–3.5)
GLUCOSE SERPL-MCNC: 139 MG/DL (ref 65–99)
HCT VFR BLD AUTO: 34.5 % (ref 37–47)
HGB BLD-MCNC: 11.5 G/DL (ref 12–16)
IMM GRANULOCYTES # BLD AUTO: 0.02 K/UL (ref 0–0.11)
IMM GRANULOCYTES NFR BLD AUTO: 0.3 % (ref 0–0.9)
IRON SATN MFR SERPL: 17 % (ref 15–55)
IRON SERPL-MCNC: 25 UG/DL (ref 40–170)
LYMPHOCYTES # BLD AUTO: 0.8 K/UL (ref 1–4.8)
LYMPHOCYTES NFR BLD: 13.1 % (ref 22–41)
MCH RBC QN AUTO: 28.5 PG (ref 27–33)
MCHC RBC AUTO-ENTMCNC: 33.3 G/DL (ref 33.6–35)
MCV RBC AUTO: 85.4 FL (ref 81.4–97.8)
MONOCYTES # BLD AUTO: 0.54 K/UL (ref 0–0.85)
MONOCYTES NFR BLD AUTO: 8.8 % (ref 0–13.4)
NEUTROPHILS # BLD AUTO: 4.49 K/UL (ref 2–7.15)
NEUTROPHILS NFR BLD: 73.6 % (ref 44–72)
NRBC # BLD AUTO: 0 K/UL
NRBC BLD-RTO: 0 /100 WBC
NT-PROBNP SERPL IA-MCNC: 466 PG/ML (ref 0–125)
PLATELET # BLD AUTO: 382 K/UL (ref 164–446)
PMV BLD AUTO: 9.1 FL (ref 9–12.9)
POTASSIUM SERPL-SCNC: 4.1 MMOL/L (ref 3.6–5.5)
PROT SERPL-MCNC: 6 G/DL (ref 6–8.2)
RBC # BLD AUTO: 4.04 M/UL (ref 4.2–5.4)
SODIUM SERPL-SCNC: 134 MMOL/L (ref 135–145)
TIBC SERPL-MCNC: 143 UG/DL (ref 250–450)
UIBC SERPL-MCNC: 118 UG/DL (ref 110–370)
WBC # BLD AUTO: 6.1 K/UL (ref 4.8–10.8)

## 2020-06-12 PROCEDURE — 36415 COLL VENOUS BLD VENIPUNCTURE: CPT

## 2020-06-12 PROCEDURE — 82728 ASSAY OF FERRITIN: CPT

## 2020-06-12 PROCEDURE — 83880 ASSAY OF NATRIURETIC PEPTIDE: CPT

## 2020-06-12 PROCEDURE — 80053 COMPREHEN METABOLIC PANEL: CPT

## 2020-06-12 PROCEDURE — 700102 HCHG RX REV CODE 250 W/ 637 OVERRIDE(OP): Performed by: HOSPITALIST

## 2020-06-12 PROCEDURE — A9270 NON-COVERED ITEM OR SERVICE: HCPCS | Performed by: HOSPITALIST

## 2020-06-12 PROCEDURE — 99226 PR SUBSEQUENT OBSERVATION CARE,LEVEL III: CPT | Mod: CS | Performed by: HOSPITALIST

## 2020-06-12 PROCEDURE — 85025 COMPLETE CBC W/AUTO DIFF WBC: CPT

## 2020-06-12 PROCEDURE — 83540 ASSAY OF IRON: CPT

## 2020-06-12 PROCEDURE — 85379 FIBRIN DEGRADATION QUANT: CPT

## 2020-06-12 PROCEDURE — 96372 THER/PROPH/DIAG INJ SC/IM: CPT

## 2020-06-12 PROCEDURE — 83550 IRON BINDING TEST: CPT

## 2020-06-12 PROCEDURE — G0378 HOSPITAL OBSERVATION PER HR: HCPCS

## 2020-06-12 PROCEDURE — 700111 HCHG RX REV CODE 636 W/ 250 OVERRIDE (IP): Performed by: HOSPITALIST

## 2020-06-12 RX ORDER — VITAMIN B COMPLEX
1000 TABLET ORAL DAILY
Status: DISCONTINUED | OUTPATIENT
Start: 2020-06-12 | End: 2020-06-13 | Stop reason: HOSPADM

## 2020-06-12 RX ORDER — ASCORBIC ACID 500 MG
1000 TABLET ORAL DAILY
Status: DISCONTINUED | OUTPATIENT
Start: 2020-06-12 | End: 2020-06-13 | Stop reason: HOSPADM

## 2020-06-12 RX ADMIN — MELATONIN 1000 UNITS: at 11:59

## 2020-06-12 RX ADMIN — Medication 1000 MG: at 11:58

## 2020-06-12 RX ADMIN — ENOXAPARIN SODIUM 60 MG: 60 INJECTION SUBCUTANEOUS at 11:58

## 2020-06-12 ASSESSMENT — ENCOUNTER SYMPTOMS
RESPIRATORY NEGATIVE: 1
HEARTBURN: 0
PALPITATIONS: 0
HEADACHES: 0
EYES NEGATIVE: 1
VOMITING: 0
POLYDIPSIA: 0
MEMORY LOSS: 1
DIZZINESS: 0
NECK PAIN: 0
CHILLS: 0
FEVER: 0
BACK PAIN: 0
WEAKNESS: 0
FOCAL WEAKNESS: 0
DEPRESSION: 0
CARDIOVASCULAR NEGATIVE: 1
COUGH: 0
NAUSEA: 0
NEUROLOGICAL NEGATIVE: 1
BRUISES/BLEEDS EASILY: 0
MUSCULOSKELETAL NEGATIVE: 1
GASTROINTESTINAL NEGATIVE: 1

## 2020-06-12 NOTE — DISCHARGE PLANNING
Hospital Care Management Discharge Planning       Anticipated Discharge Disposition:   · Home with family     Action:   · Chart reviewed, pt discussed in IDT rounds.     Barriers to Discharge:   · Medical cleaance     Plan:    · Continue to provide support services and assistance with discharge planning as needed.

## 2020-06-12 NOTE — CARE PLAN
Problem: Safety  Goal: Will remain free from falls  Outcome: PROGRESSING AS EXPECTED     Problem: Infection  Goal: Will remain free from infection  Outcome: PROGRESSING SLOWER THAN EXPECTED     Problem: Mobility  Goal: Risk for activity intolerance will decrease  Outcome: PROGRESSING AS EXPECTED

## 2020-06-12 NOTE — PROGRESS NOTES
[facility] Medicine Daily Progress Note    Date of Service  6/12/2020    Chief Complaint  85 y.o. female admitted 6/10/2020 with cough and weakness    Hospital Course    85-year-old female admitted with COVID-19 pneumonia      Interval Problem Update  Patient seen and examined today.    Patient tolerating treatment and therapies.  All Data, Medication data reviewed.  Case discussed with nursing as available.  Plan of Care reviewed with patient and notified of changes.  6/12 the patient feels back to baseline she states, her mentation appears to have improved, she is alert and oriented x4, she states that her her son had coronavirus as well but has recovered and is back to work.  The patient is afebrile, vital signs fairly unremarkable, the patient is on room air, her laboratory data indicate a slightly elevated BNP, abnormal d-dimer as well as a elevated ferritin level.    Consultants/Specialty  None    Code Status  Full code    Disposition  TBD    Review of Systems  Review of Systems   Constitutional: Positive for malaise/fatigue. Negative for chills and fever.   HENT: Negative.    Eyes: Negative.    Respiratory: Negative.  Negative for cough.    Cardiovascular: Negative.  Negative for chest pain and palpitations.   Gastrointestinal: Negative.  Negative for heartburn, nausea and vomiting.   Genitourinary: Negative.  Negative for dysuria and frequency.   Musculoskeletal: Negative.  Negative for back pain and neck pain.   Skin: Negative.  Negative for itching and rash.   Neurological: Negative.  Negative for dizziness, focal weakness, weakness and headaches.   Endo/Heme/Allergies: Negative.  Negative for polydipsia. Does not bruise/bleed easily.   Psychiatric/Behavioral: Positive for memory loss. Negative for depression.        Physical Exam  Temp:  [36.2 °C (97.1 °F)-37.1 °C (98.8 °F)] 37.1 °C (98.8 °F)  Pulse:  [63-70] 63  Resp:  [14-19] 19  BP: (114-149)/(47-97) 138/47  SpO2:  [93 %-96 %] 96 %    Physical  Exam  Vitals signs and nursing note reviewed.   Constitutional:       Appearance: She is well-developed. She is not diaphoretic.   HENT:      Head: Normocephalic and atraumatic.      Nose: Nose normal.   Eyes:      Conjunctiva/sclera: Conjunctivae normal.      Pupils: Pupils are equal, round, and reactive to light.   Neck:      Musculoskeletal: Normal range of motion and neck supple.      Thyroid: No thyromegaly.      Vascular: No JVD.   Cardiovascular:      Rate and Rhythm: Normal rate and regular rhythm.      Heart sounds: Normal heart sounds. No friction rub. No gallop.    Pulmonary:      Effort: Pulmonary effort is normal.      Breath sounds: Normal breath sounds. No wheezing or rales.   Abdominal:      General: Bowel sounds are normal. There is no distension.      Palpations: Abdomen is soft. There is no mass.      Tenderness: There is no abdominal tenderness. There is no guarding or rebound.   Musculoskeletal: Normal range of motion.         General: No tenderness.   Lymphadenopathy:      Cervical: No cervical adenopathy.   Skin:     General: Skin is warm and dry.   Neurological:      Mental Status: She is alert and oriented to person, place, and time.      Cranial Nerves: No cranial nerve deficit.   Psychiatric:         Behavior: Behavior normal.         Fluids    Intake/Output Summary (Last 24 hours) at 6/12/2020 0755  Last data filed at 6/12/2020 0330  Gross per 24 hour   Intake 751.25 ml   Output 0 ml   Net 751.25 ml       Laboratory  Recent Labs     06/10/20  2250 06/12/20  0523   WBC 7.2 6.1   RBC 4.22 4.04*   HEMOGLOBIN 11.9* 11.5*   HEMATOCRIT 36.2* 34.5*   MCV 85.8 85.4   MCH 28.2 28.5   MCHC 32.9* 33.3*   RDW 39.9 39.3   PLATELETCT 376 382   MPV 9.6 9.1     Recent Labs     06/10/20  2250 06/12/20  0523   SODIUM 128* 134*   POTASSIUM 4.7 4.1   CHLORIDE 99 105   CO2 21 19*   GLUCOSE 117* 139*   BUN 14 9   CREATININE 0.77 0.45*   CALCIUM 8.6 8.9     Recent Labs     06/10/20  2250   APTT 36.5*   INR 1.00                Imaging  CT-CTA CHEST PULMONARY ARTERY W/ RECONS   Final Result         1.  No pulmonary embolus appreciated.   2.  Multilobar infiltrates   3.  Irregular linear structure in the anterior left spleen, appearance suggests calcification   4.  Atherosclerosis and atherosclerotic coronary artery disease.      DX-CHEST-PORTABLE (1 VIEW)   Final Result         1.  Pulmonary edema and/or infiltrates.   2.  Atherosclerosis           Assessment/Plan  Pneumonia due to COVID-19 virus- (present on admission)  Assessment & Plan  Monitor oxygen saturations closely, consider proning and high flow oxygen if patient develops worsening hypoxia  The patient continues to be on room air, she appears to be fairly asymptomatic  She does present with abnormal ferritin level and d-dimer  Supportive care  If patient has worsening symptoms consider Remdesivir  Monitor closely  Contact/eye/droplet precautions in place    Anemia- (present on admission)  Assessment & Plan  Patient denies any bleeding or melena  Monitor CBC, transfuse for hemoglobin less than 7  We will check iron studies in the morning      Hyponatremia- (present on admission)  Assessment & Plan  Status post gentle hydration, mild, monitor       Plan  The patient does overall quite well, hopefully her laboratory data will improve  We will start prophylactic anticoagulation  Discussed with family in terms of the patient's discharge options  Symptomatic care  Increase activity and evaluate for discharge needs  See orders    VTE prophylaxis: Lovenox    I have performed a physical exam and reviewed and updated ROS and Plan today . In review of yesterday's note , there are no changes except as documented above.        Please note that this dictation was created using voice recognition software. I have made every reasonable attempt to correct obvious errors, but I expect that there are errors of grammar and possibly context that I did not discover before finalizing the  note.

## 2020-06-12 NOTE — CARE PLAN
Problem: Safety  Goal: Will remain free from falls  Outcome: PROGRESSING AS EXPECTED  Pt able to make needs known and ask for assistance to the bathroom.  Hourly rounding being completed by staff to ensure patient needs are met.     Problem: Venous Thromboembolism (VTW)/Deep Vein Thrombosis (DVT) Prevention:  Goal: Patient will participate in Venous Thrombosis (VTE)/Deep Vein Thrombosis (DVT)Prevention Measures  Outcome: PROGRESSING AS EXPECTED  Lovenox ordered and administered to patient with explanation of medication purpose.  Pt expressed understanding and agreed to medication.     Problem: Knowledge Deficit  Goal: Knowledge of disease process/condition, treatment plan, diagnostic tests, and medications will improve  Outcome: PROGRESSING AS EXPECTED  Plan of care, medications and tests explained to patient and she expressed understanding.     Problem: Respiratory:  Goal: Respiratory status will improve  Outcome: PROGRESSING AS EXPECTED  Pt on room air, denies shortness of breath today, no coughing noted.

## 2020-06-12 NOTE — PROGRESS NOTES
"/97   Pulse 66   Temp 36.9 °C (98.5 °F) (Temporal)   Resp 17   Ht 1.6 m (5' 3\")   Wt 68 kg (150 lb)   SpO2 93%   BMI 26.57 kg/m²   Received report and assumed care of pt at 1900. Pt is A&O x4. Pt denies pain, nausea, or SOB. POC discussed and updated. Fall precautions are in place. PIV's are patent and SL. Bed is in lowest position and call light is within reach. Hourly rounding is in place.   "

## 2020-06-13 VITALS
HEIGHT: 63 IN | BODY MASS INDEX: 23.44 KG/M2 | RESPIRATION RATE: 16 BRPM | OXYGEN SATURATION: 95 % | WEIGHT: 132.28 LBS | DIASTOLIC BLOOD PRESSURE: 45 MMHG | HEART RATE: 66 BPM | TEMPERATURE: 96.7 F | SYSTOLIC BLOOD PRESSURE: 123 MMHG

## 2020-06-13 PROBLEM — J12.82 PNEUMONIA DUE TO COVID-19 VIRUS: Status: RESOLVED | Noted: 2020-06-11 | Resolved: 2020-06-13

## 2020-06-13 PROBLEM — E87.1 HYPONATREMIA: Status: RESOLVED | Noted: 2020-06-11 | Resolved: 2020-06-13

## 2020-06-13 PROBLEM — U07.1 PNEUMONIA DUE TO COVID-19 VIRUS: Status: RESOLVED | Noted: 2020-06-11 | Resolved: 2020-06-13

## 2020-06-13 LAB
ALBUMIN SERPL BCP-MCNC: 2.9 G/DL (ref 3.2–4.9)
ALBUMIN/GLOB SERPL: 0.9 G/DL
ALP SERPL-CCNC: 211 U/L (ref 30–99)
ALT SERPL-CCNC: 21 U/L (ref 2–50)
ANION GAP SERPL CALC-SCNC: 7 MMOL/L (ref 7–16)
AST SERPL-CCNC: 16 U/L (ref 12–45)
BILIRUB SERPL-MCNC: 0.4 MG/DL (ref 0.1–1.5)
BUN SERPL-MCNC: 13 MG/DL (ref 8–22)
CALCIUM SERPL-MCNC: 9.5 MG/DL (ref 8.5–10.5)
CHLORIDE SERPL-SCNC: 101 MMOL/L (ref 96–112)
CO2 SERPL-SCNC: 24 MMOL/L (ref 20–33)
CREAT SERPL-MCNC: 0.56 MG/DL (ref 0.5–1.4)
ERYTHROCYTE [DISTWIDTH] IN BLOOD BY AUTOMATED COUNT: 38.5 FL (ref 35.9–50)
GLOBULIN SER CALC-MCNC: 3.4 G/DL (ref 1.9–3.5)
GLUCOSE SERPL-MCNC: 142 MG/DL (ref 65–99)
HCT VFR BLD AUTO: 34 % (ref 37–47)
HGB BLD-MCNC: 11.3 G/DL (ref 12–16)
IL6 SERPL-MCNC: 7.6 PG/ML
MAGNESIUM SERPL-MCNC: 1.7 MG/DL (ref 1.5–2.5)
MCH RBC QN AUTO: 27.7 PG (ref 27–33)
MCHC RBC AUTO-ENTMCNC: 33.2 G/DL (ref 33.6–35)
MCV RBC AUTO: 83.3 FL (ref 81.4–97.8)
PLATELET # BLD AUTO: 488 K/UL (ref 164–446)
PMV BLD AUTO: 9 FL (ref 9–12.9)
POTASSIUM SERPL-SCNC: 4.4 MMOL/L (ref 3.6–5.5)
PROT SERPL-MCNC: 6.3 G/DL (ref 6–8.2)
RBC # BLD AUTO: 4.08 M/UL (ref 4.2–5.4)
SODIUM SERPL-SCNC: 132 MMOL/L (ref 135–145)
WBC # BLD AUTO: 6.1 K/UL (ref 4.8–10.8)

## 2020-06-13 PROCEDURE — A9270 NON-COVERED ITEM OR SERVICE: HCPCS | Performed by: HOSPITALIST

## 2020-06-13 PROCEDURE — 85027 COMPLETE CBC AUTOMATED: CPT

## 2020-06-13 PROCEDURE — 700102 HCHG RX REV CODE 250 W/ 637 OVERRIDE(OP): Performed by: HOSPITALIST

## 2020-06-13 PROCEDURE — 83735 ASSAY OF MAGNESIUM: CPT

## 2020-06-13 PROCEDURE — 96372 THER/PROPH/DIAG INJ SC/IM: CPT

## 2020-06-13 PROCEDURE — 80053 COMPREHEN METABOLIC PANEL: CPT

## 2020-06-13 PROCEDURE — 99217 PR OBSERVATION CARE DISCHARGE: CPT | Mod: CS | Performed by: HOSPITALIST

## 2020-06-13 PROCEDURE — G0378 HOSPITAL OBSERVATION PER HR: HCPCS

## 2020-06-13 PROCEDURE — 36415 COLL VENOUS BLD VENIPUNCTURE: CPT

## 2020-06-13 PROCEDURE — A9270 NON-COVERED ITEM OR SERVICE: HCPCS | Performed by: INTERNAL MEDICINE

## 2020-06-13 PROCEDURE — 700111 HCHG RX REV CODE 636 W/ 250 OVERRIDE (IP): Performed by: HOSPITALIST

## 2020-06-13 PROCEDURE — 700102 HCHG RX REV CODE 250 W/ 637 OVERRIDE(OP): Performed by: INTERNAL MEDICINE

## 2020-06-13 RX ORDER — LANOLIN ALCOHOL/MO/W.PET/CERES
325 CREAM (GRAM) TOPICAL
Qty: 90 TAB | Refills: 1 | Status: SHIPPED | OUTPATIENT
Start: 2020-06-13

## 2020-06-13 RX ADMIN — MELATONIN 1000 UNITS: at 05:03

## 2020-06-13 RX ADMIN — Medication 1000 MG: at 05:03

## 2020-06-13 RX ADMIN — DOCUSATE SODIUM 50 MG AND SENNOSIDES 8.6 MG 2 TABLET: 8.6; 5 TABLET, FILM COATED ORAL at 05:03

## 2020-06-13 RX ADMIN — ENOXAPARIN SODIUM 60 MG: 60 INJECTION SUBCUTANEOUS at 05:03

## 2020-06-13 ASSESSMENT — LIFESTYLE VARIABLES
HAVE YOU EVER FELT YOU SHOULD CUT DOWN ON YOUR DRINKING: NO
HAVE PEOPLE ANNOYED YOU BY CRITICIZING YOUR DRINKING: NO
ALCOHOL_USE: NO
HOW MANY TIMES IN THE PAST YEAR HAVE YOU HAD 5 OR MORE DRINKS IN A DAY: 0
TOTAL SCORE: 0
TOTAL SCORE: 0
AVERAGE NUMBER OF DAYS PER WEEK YOU HAVE A DRINK CONTAINING ALCOHOL: 0
EVER HAD A DRINK FIRST THING IN THE MORNING TO STEADY YOUR NERVES TO GET RID OF A HANGOVER: NO
CONSUMPTION TOTAL: NEGATIVE
ON A TYPICAL DAY WHEN YOU DRINK ALCOHOL HOW MANY DRINKS DO YOU HAVE: 0
EVER FELT BAD OR GUILTY ABOUT YOUR DRINKING: NO
TOTAL SCORE: 0

## 2020-06-13 ASSESSMENT — FIBROSIS 4 INDEX: FIB4 SCORE: 0.81

## 2020-06-13 NOTE — CARE PLAN
Problem: Safety  Goal: Will remain free from injury  Outcome: PROGRESSING AS EXPECTED  Intervention: Provide assistance with mobility  Flowsheets (Taken 6/12/2020 2231)  Assistance: Standby Assist     Problem: Infection  Goal: Will remain free from infection  Outcome: PROGRESSING AS EXPECTED

## 2020-06-13 NOTE — DISCHARGE INSTRUCTIONS
Physician Discharge Instructions:  Discharge Diagnosis: COVID-19 pneumonia per imaging, no persistent symptoms of upper respiratory infection or hypoxia  Proceed to discharge/transfer  to home with close [facility]-up at [facility]  Take Rx/Prescriptions as prescribed and reconciled per AVS  For OTC Medication consult with your Pharmacist first.  Diet: As tolerated  Activity: As tolerated, encourage activity as much as possible  F/u with PCP within 3-10 days at home location, ask for f/u prescriptions by PCP  The patient to obtain medication at the [facility] pharmacy as soon as possible  F/u with Specialty MD: None  For new, severe symptoms refer to the next Emergency Care or call your Physician.  Controlled Substance History was reviewed if new Rx was issued.    Zain Esteban MD    Discharge Instructions    Discharged to home by car with relative. Discharged via wheelchair, hospital escort: Yes.  Special equipment needed: Not Applicable    Be sure to schedule a follow-up appointment with your primary care doctor or any specialists as instructed.     Discharge Plan:   Diet Plan: Discussed  Activity Level: Discussed  Confirmed Follow up Appointment: Patient to Call and Schedule Appointment  Confirmed Symptoms Management: Discussed  Medication Reconciliation Updated: Yes    I understand that a diet low in cholesterol, fat, and sodium is recommended for good health. Unless I have been given specific instructions below for another diet, I accept this instruction as my diet prescription.   Other diet: Diabetic    Special Instructions: None    · Is patient discharged on Warfarin / Coumadin?   No     Depression / Suicide Risk    As you are discharged from this [facility] facility, it is important to learn how to keep safe from harming yourself.    Recognize the warning signs:  · Abrupt changes in personality, positive or negative- including increase in energy   · Giving away possessions  · Change in eating  patterns- significant weight changes-  positive or negative  · Change in sleeping patterns- unable to sleep or sleeping all the time   · Unwillingness or inability to communicate  · Depression  · Unusual sadness, discouragement and loneliness  · Talk of wanting to die  · Neglect of personal appearance   · Rebelliousness- reckless behavior  · Withdrawal from people/activities they love  · Confusion- inability to concentrate     If you or a loved one observes any of these behaviors or has concerns about self-harm, here's what you can do:  · Talk about it- your feelings and reasons for harming yourself  · Remove any means that you might use to hurt yourself (examples: pills, rope, extension cords, firearm)  · Get professional help from the community (Mental Health, Substance Abuse, psychological counseling)  · Do not be alone:Call your Safe Contact- someone whom you trust who will be there for you.  · Call your local CRISIS HOTLINE 350-6920 or 791-865-9119  · Call your local Children's Mobile Crisis Response Team [place] (797) 296-4130 or www.[employer]  · Call the toll free National Suicide Prevention Hotlines   · National Suicide Prevention Lifeline 751-132-MKVV (9445)  · National Hope Line Network 800-SUICIDE (950-3350)

## 2020-06-13 NOTE — PROGRESS NOTES
Monitor Summary:    Rhythm: SB-SR 58-71  Ectopy: (F) PVC, (O) PAC, (R) Bigem, (R) Coup  .18 / .08 / .36

## 2020-06-13 NOTE — DISCHARGE PLANNING
"Plan: Home with son    Barrier: Medical Clearance    Action: Updated Admit Profile: Patient lives in a one story home with her son. She has an adult granddaughter that also lives here in [place] that can assist her as needed. Her PCP is Nancy Langford. She takes her medications as Rx'd. Was not receiving Home [facility] or any assistive services. Patient expects to be discharged home, has no mobility issues, and does not use any DME at home.    Vitals Stable: /46   Pulse 66   Temp 36.1 °C (97 °F) (Temporal)   Resp 18   Ht 1.6 m (5' 3\")   Wt 60 kg (132 lb 4.4 oz)   SpO2 96%      Labs unremarkable except Covid +, but able to self isolate at home.    Outcome: Will discuss with MD Carlito if patient may DC today.  "

## 2020-06-13 NOTE — DISCHARGE SUMMARY
Discharge Summary    CHIEF COMPLAINT ON ADMISSION  Chief Complaint   Patient presents with   • Weakness   • Mental Status Change       Reason for Admission  Weakness     Admission Date  6/10/2020    CODE STATUS  Full Code    HPI & HOSPITAL COURSE  This is a 85 y.o. female  85-year-old female admitted with COVID-19 pneumonia   The patient presented initially with weakness and confusion, no significant respiratory symptoms were reported, there might be COVID exposure in her home environment, I discussed the case with the patient's daughter.  The patient was brought to the hospital and closely evaluated she had a CT of the chest ruling out a pulmonary embolism, but it showed multilobar infiltrates, the patient remained stable throughout the hospitalization without evidence of hypoxia, tachypnea or respiratory difficulty.  Her mentation returned back to baseline.  The patient remained on room air and afebrile, no respiratory symptoms, her laboratory data revealed not much in terms of COVID related abnormalities except for the elevated d-dimer to 1.55 and a elevated ferritin level as well as CRP.  Patient had negative blood cultures taken on intake they remain negative throughout hospitalization, at this point the patient appears stable for discharge to home and close observation.  Recommendation would be to supplement with vitamin C vitamin D and to enroll in a 30-day full anticoagulation prophylaxis with Eliquis secondary to the patient's elevated d-dimer and the prothrombotic effects of COVID-19.  I discussed current status and recommendations in detail with the patient's daughter.  At this time the patient is medically optimized for discharge.  It any given time if the patient's status at home worsens the patient to be brought back to the hospital or medical attention at the [facility] where she usually receives her health care.  Therefore, she is discharged in fair and stable condition to home with close  outpatient follow-up.        Discharge Date  6/13/2020    FOLLOW UP ITEMS POST DISCHARGE  Close follow-up with the [facility] to reestablish care after her COVID-19 diagnosis and to assure availability of her prescribed medications    DISCHARGE DIAGNOSES  COVID-19 positive PCR with CT evidence of bilateral pulmonary infiltrates, grossly asymptomatic  Active Problems:    Anemia POA: Yes  Resolved Problems:    Pneumonia due to COVID-19 virus POA: Yes    Hyponatremia POA: Yes  Abnormal ferritin and d-dimer level with COVID-19 infection    FOLLOW UP  At the [facility] soon as possible after discharge    MEDICATIONS ON DISCHARGE     Medication List      START taking these medications      Instructions   apixaban 5mg Tabs  Commonly known as:  ELIQUIS   Take 1 Tab by mouth 2 Times a Day for 27 days.  Dose:  5 mg     ascorbic acid 1000 MG tablet  Start taking on:  June 14, 2020  Commonly known as:  VITAMIN C   Take 1 Tab by mouth every day.  Dose:  1,000 mg     Cholecalciferol 1000 UNIT Tabs  Start taking on:  June 14, 2020  Commonly known as:  VITAMIND D3   Take 1 Tab by mouth every day.  Dose:  1,000 Units     ferrous sulfate 325 (65 Fe) MG EC tablet   Take 1 Tab by mouth 3 times a day, with meals.  Dose:  325 mg        CONTINUE taking these medications      Instructions   meclizine 12.5 MG Tabs  Commonly known as:  ANTIVERT   Take 12.5 mg by mouth 3 times a day as needed.  Dose:  12.5 mg     METFORMIN HCL PO   Take  by mouth.        STOP taking these medications    LISINOPRIL PO            Allergies  No Known Allergies    DIET  Orders Placed This Encounter   Procedures   • Diet Order Diabetic     Standing Status:   Standing     Number of Occurrences:   1     Order Specific Question:   Diet:     Answer:   Diabetic [3]       ACTIVITY  As tolerated.  Weight bearing as tolerated    CONSULTATIONS  None    PROCEDURES  CT chest  6/11/2020 2:26 AM     HISTORY/REASON FOR EXAM:  Cough, dyspnea, positive  d-dimer     TECHNIQUE/EXAM DESCRIPTION:  CT angiogram of the chest with contrast.     Transaxial MDCT scan of chest post bolus 65 cc Omnipaque 350 IV administration. MIP reconstructed images were created and reviewed.     Low dose optimization technique was utilized for this CT exam including automated exposure control and adjustment of the mA and/or kV according to patient size.     COMPARISON: None     FINDINGS:     The visualized portion of the thyroid appear within normal limits.  The trachea and main stem airways are normal in caliber. There are no pathologically enlarged mediastinal lymph nodes.     The heart and pericardium appear within normal limits.   Atherosclerotic changes are seen including atherosclerotic coronary artery calcifications. The aorta and its main branch vessels are normal in caliber and configuration.  The pulmonary arteries are   well opacified without visualized filling defect.     The pulmonary parenchyma demonstrates patchy airspace opacities are seen throughout the lungs bilaterally.     Limited views of the abdomen demonstrate irregularly dense linear structure in the anterior spleen.     The bony structures are age appropriate.     IMPRESSION:        1.  No pulmonary embolus appreciated.  2.  Multilobar infiltrates  3.  Irregular linear structure in the anterior left spleen, appearance suggests calcification  4.  Atherosclerosis and atherosclerotic coronary artery disease.  LABORATORY  Lab Results   Component Value Date    SODIUM 132 (L) 06/13/2020    POTASSIUM 4.4 06/13/2020    CHLORIDE 101 06/13/2020    CO2 24 06/13/2020    GLUCOSE 142 (H) 06/13/2020    BUN 13 06/13/2020    CREATININE 0.56 06/13/2020        Lab Results   Component Value Date    WBC 6.1 06/13/2020    HEMOGLOBIN 11.3 (L) 06/13/2020    HEMATOCRIT 34.0 (L) 06/13/2020    PLATELETCT 488 (H) 06/13/2020        Total time of the discharge process exceeds 55 minutes.

## 2020-06-16 LAB
BACTERIA BLD CULT: NORMAL
BACTERIA BLD CULT: NORMAL
SIGNIFICANT IND 70042: NORMAL
SIGNIFICANT IND 70042: NORMAL
SITE SITE: NORMAL
SITE SITE: NORMAL
SOURCE SOURCE: NORMAL
SOURCE SOURCE: NORMAL

## 2021-01-14 DIAGNOSIS — Z23 NEED FOR VACCINATION: ICD-10-CM

## 2024-04-12 NOTE — H&P
Hospital Medicine History & Physical Note    Date of Service  6/11/2020    Primary Care Physician  No primary care provider on file.    Code Status  Full Code    Chief Complaint  Chief Complaint   Patient presents with   • Weakness   • Mental Status Change       History of Presenting Illness  85 y.o. female who presented 6/10/2020 with shortness of breath and cough.  Patient states that she has been living with her son has tested positive for Covid 19.  She reports fevers and chills.  She denies any headache, neck stiffness, nausea, vomiting, abdominal pain or diarrhea.  Patient was seen in the ER 7 days ago for UTI for which she was prescribed antibiotics and discharged home.  She denies any active dysuria.    EKG interpreted by me reveals sinus rhythm with no ST elevation or ST depression.  Low voltage noted diffusely    CTA chest with IV contrast revealed no PE.  Multi lobar infiltrates are noted    Review of Systems  Review of Systems   Constitutional: Negative for chills, diaphoresis and fever.   HENT: Negative for hearing loss and sore throat.    Eyes: Negative for blurred vision.   Respiratory: Positive for cough and shortness of breath. Negative for sputum production and wheezing.    Cardiovascular: Negative for chest pain, palpitations and leg swelling.   Gastrointestinal: Negative for abdominal pain, blood in stool, diarrhea, nausea and vomiting.   Genitourinary: Negative for dysuria, flank pain and urgency.   Musculoskeletal: Negative for back pain, joint pain, myalgias and neck pain.   Skin: Negative for rash.   Neurological: Negative for dizziness, focal weakness, seizures and headaches.   Endo/Heme/Allergies: Does not bruise/bleed easily.   Psychiatric/Behavioral: Negative for suicidal ideas.   All other systems reviewed and are negative.      Past Medical History   has a past medical history of Hypertension.    Surgical History  No pertinent surgical history    Family History  No pertinent family  history    Social History   reports that she has never smoked. She has never used smokeless tobacco. She reports previous alcohol use. She reports that she does not use drugs.    Allergies  No Known Allergies    Medications  Prior to Admission Medications   Prescriptions Last Dose Informant Patient Reported? Taking?   LISINOPRIL PO   Yes No   Sig: Take  by mouth.   METFORMIN HCL PO   Yes No   Sig: Take  by mouth.   meclizine (ANTIVERT) 12.5 MG Tab   Yes No   Sig: Take 12.5 mg by mouth 3 times a day as needed.      Facility-Administered Medications: None       Physical Exam  Temp:  [36.4 °C (97.5 °F)] 36.4 °C (97.5 °F)  Pulse:  [] 103  Resp:  [13-19] 19  BP: (124-164)/(59-72) 164/72  SpO2:  [93 %-96 %] 94 %    Physical Exam  Vitals signs and nursing note reviewed.   Constitutional:       General: She is not in acute distress.     Appearance: Normal appearance.   HENT:      Head: Normocephalic and atraumatic.      Nose: Nose normal.      Mouth/Throat:      Mouth: Mucous membranes are moist.   Eyes:      Extraocular Movements: Extraocular movements intact.      Conjunctiva/sclera: Conjunctivae normal.      Pupils: Pupils are equal, round, and reactive to light.   Neck:      Musculoskeletal: Normal range of motion and neck supple.   Cardiovascular:      Rate and Rhythm: Normal rate and regular rhythm.      Pulses: Normal pulses.      Heart sounds: Normal heart sounds.   Pulmonary:      Effort: Pulmonary effort is normal. No respiratory distress.      Breath sounds: Normal breath sounds. No wheezing, rhonchi or rales.   Abdominal:      General: Bowel sounds are normal. There is no distension.      Palpations: Abdomen is soft.      Tenderness: There is no abdominal tenderness.   Musculoskeletal: Normal range of motion.         General: No swelling or tenderness.   Lymphadenopathy:      Cervical: No cervical adenopathy.   Skin:     General: Skin is warm.      Coloration: Skin is not jaundiced.      Findings: No rash.    Neurological:      General: No focal deficit present.      Mental Status: She is alert and oriented to person, place, and time.      Cranial Nerves: No cranial nerve deficit.      Motor: No weakness.   Psychiatric:         Mood and Affect: Mood normal.         Behavior: Behavior normal.         Laboratory:  Recent Labs     06/10/20  2250   WBC 7.2   RBC 4.22   HEMOGLOBIN 11.9*   HEMATOCRIT 36.2*   MCV 85.8   MCH 28.2   MCHC 32.9*   RDW 39.9   PLATELETCT 376   MPV 9.6     Recent Labs     06/10/20  2250   SODIUM 128*   POTASSIUM 4.7   CHLORIDE 99   CO2 21   GLUCOSE 117*   BUN 14   CREATININE 0.77   CALCIUM 8.6     Recent Labs     06/10/20  2250   ALTSGPT 23   ASTSGOT 19   ALKPHOSPHAT 204*   TBILIRUBIN 0.8   GLUCOSE 117*     Recent Labs     06/10/20  2250   APTT 36.5*   INR 1.00     Recent Labs     06/10/20  2250   NTPROBNP 278*         Recent Labs     06/10/20  2250   TROPONINT 11       Imaging:  DX-CHEST-PORTABLE (1 VIEW)   Final Result         1.  Pulmonary edema and/or infiltrates.   2.  Atherosclerosis      CT-CTA CHEST PULMONARY ARTERY W/ RECONS    (Results Pending)         Assessment/Plan:  Patient will be placed under observation status      Pneumonia due to COVID-19 virus- (present on admission)  Assessment & Plan  Monitor oxygen saturations closely  Check procalcitonin  Supportive care  If patient has worsening symptoms consider Remdesivir  Check inflammatory markers  Contact/eye/droplet precautions in place    Anemia- (present on admission)  Assessment & Plan  Patient denies any bleeding or melena  Monitor CBC, transfuse for hemoglobin less than 7      Hyponatremia- (present on admission)  Assessment & Plan  Gentle IV fluid hydration with normal saline  Monitor BMP and assess response       [Joint Pain] : joint pain [Negative] : Heme/Lymph